# Patient Record
Sex: MALE | Race: WHITE | NOT HISPANIC OR LATINO | Employment: OTHER | URBAN - METROPOLITAN AREA
[De-identification: names, ages, dates, MRNs, and addresses within clinical notes are randomized per-mention and may not be internally consistent; named-entity substitution may affect disease eponyms.]

---

## 2017-06-22 ENCOUNTER — RECORDS - HEALTHEAST (OUTPATIENT)
Dept: LAB | Facility: CLINIC | Age: 67
End: 2017-06-22

## 2017-06-23 LAB
CHOLEST SERPL-MCNC: 189 MG/DL
FASTING STATUS PATIENT QL REPORTED: ABNORMAL
HDLC SERPL-MCNC: 40 MG/DL
LDLC SERPL CALC-MCNC: 114 MG/DL
TRIGL SERPL-MCNC: 176 MG/DL

## 2018-08-29 ENCOUNTER — RECORDS - HEALTHEAST (OUTPATIENT)
Dept: LAB | Facility: CLINIC | Age: 68
End: 2018-08-29

## 2018-08-29 LAB
ANION GAP SERPL CALCULATED.3IONS-SCNC: 11 MMOL/L (ref 5–18)
BUN SERPL-MCNC: 17 MG/DL (ref 8–22)
CALCIUM SERPL-MCNC: 9.8 MG/DL (ref 8.5–10.5)
CHLORIDE BLD-SCNC: 106 MMOL/L (ref 98–107)
CHOLEST SERPL-MCNC: 201 MG/DL
CO2 SERPL-SCNC: 25 MMOL/L (ref 22–31)
CREAT SERPL-MCNC: 0.79 MG/DL (ref 0.7–1.3)
FASTING STATUS PATIENT QL REPORTED: ABNORMAL
GFR SERPL CREATININE-BSD FRML MDRD: >60 ML/MIN/1.73M2
GLUCOSE BLD-MCNC: 100 MG/DL (ref 70–125)
HDLC SERPL-MCNC: 54 MG/DL
LDLC SERPL CALC-MCNC: 126 MG/DL
POTASSIUM BLD-SCNC: 4.3 MMOL/L (ref 3.5–5)
PSA SERPL-MCNC: 5.7 NG/ML (ref 0–4.5)
SODIUM SERPL-SCNC: 142 MMOL/L (ref 136–145)
TRIGL SERPL-MCNC: 105 MG/DL

## 2018-09-18 ENCOUNTER — RECORDS - HEALTHEAST (OUTPATIENT)
Dept: LAB | Facility: CLINIC | Age: 68
End: 2018-09-18

## 2018-09-18 LAB — PSA SERPL-MCNC: 6.5 NG/ML (ref 0–4.5)

## 2023-02-23 ENCOUNTER — TRANSCRIBE ORDERS (OUTPATIENT)
Dept: OTHER | Age: 73
End: 2023-02-23

## 2023-02-23 DIAGNOSIS — M79.672 PAIN IN LEFT FOOT: Primary | ICD-10-CM

## 2023-03-06 NOTE — TELEPHONE ENCOUNTER
DIAGNOSIS: Left foot hallux ridigis   APPOINTMENT DATE: 03/08/2023   NOTES STATUS DETAILS   OFFICE NOTE from referring provider In process 02/23/2023 [facility]   OFFICE NOTE from other specialist N/A    DISCHARGE SUMMARY from hospital N/A    DISCHARGE REPORT from the ER N/A    OPERATIVE REPORT N/A    EMG report N/A    MEDICATION LIST N/A    MRI N/A    DEXA (osteoporosis/bone health) N/A    CT SCAN N/A    XRAYS (IMAGES & REPORTS) N/A      Sent request for all records and images  Reyna Vogel on 3/6/2023 at 3:51 PM    2nd STAT request sent, xray in PACS  Reyna Vogel on 3/8/2023 at 7:28 AM

## 2023-03-08 ENCOUNTER — ANCILLARY PROCEDURE (OUTPATIENT)
Dept: GENERAL RADIOLOGY | Facility: CLINIC | Age: 73
End: 2023-03-08
Attending: PODIATRIST
Payer: COMMERCIAL

## 2023-03-08 ENCOUNTER — PRE VISIT (OUTPATIENT)
Dept: PODIATRY | Facility: CLINIC | Age: 73
End: 2023-03-08
Payer: COMMERCIAL

## 2023-03-08 ENCOUNTER — OFFICE VISIT (OUTPATIENT)
Dept: PODIATRY | Facility: CLINIC | Age: 73
End: 2023-03-08
Payer: COMMERCIAL

## 2023-03-08 VITALS — HEART RATE: 77 BPM | DIASTOLIC BLOOD PRESSURE: 80 MMHG | WEIGHT: 212 LBS | SYSTOLIC BLOOD PRESSURE: 150 MMHG

## 2023-03-08 DIAGNOSIS — M20.22 HALLUX RIGIDUS, LEFT FOOT: Primary | ICD-10-CM

## 2023-03-08 DIAGNOSIS — M20.22 HALLUX RIGIDUS, LEFT FOOT: ICD-10-CM

## 2023-03-08 PROBLEM — E78.5 HYPERLIPIDEMIA: Status: ACTIVE | Noted: 2023-03-08

## 2023-03-08 PROBLEM — J01.80 OTHER ACUTE SINUSITIS: Status: ACTIVE | Noted: 2023-03-08

## 2023-03-08 PROBLEM — Q66.50 CONGENITAL PES PLANUS, UNSPECIFIED FOOT: Status: ACTIVE | Noted: 2023-03-08

## 2023-03-08 PROBLEM — R97.20 HIGH PROSTATE SPECIFIC ANTIGEN (PSA): Status: ACTIVE | Noted: 2023-03-08

## 2023-03-08 PROBLEM — I10 HYPERTENSION: Status: ACTIVE | Noted: 2023-03-08

## 2023-03-08 PROCEDURE — 73630 X-RAY EXAM OF FOOT: CPT | Mod: LT | Performed by: RADIOLOGY

## 2023-03-08 PROCEDURE — 99204 OFFICE O/P NEW MOD 45 MIN: CPT | Performed by: PODIATRIST

## 2023-03-08 RX ORDER — FLUCONAZOLE 200 MG/1
200 TABLET ORAL
COMMUNITY
Start: 2023-02-08 | End: 2023-04-12

## 2023-03-08 RX ORDER — ATORVASTATIN CALCIUM 10 MG/1
10 TABLET, FILM COATED ORAL
COMMUNITY
Start: 2023-02-08

## 2023-03-08 NOTE — PROGRESS NOTES
[facility] of painful left foot.  This has been symptomatic for years.  Points to dorsum of first metatarsal phalangeal joint.  Has pain w/ ambulation and shoewear and is relieved by rest.  Denies pain in the contralateral foot.  Has tried carbon plates and anti-inflammatories.  Still has pain.  He is very active.  Patient likes to walk and jog for exercise.  He is wondering about more of a permanent fix for this.  He believes he may have injured this in the past.  Denies family history of arthritis here.  He is retired.  He has never smoked.    ROS:  A ten point review of systems was performed and negative except where indicated above.          No Known Allergies    Current Outpatient Medications   Medication Sig Dispense Refill     atorvastatin (LIPITOR) 10 MG tablet 10 mg       fluconazole (DIFLUCAN) 200 MG tablet 200 mg         Patient Active Problem List   Diagnosis     Other acute sinusitis     Hypertension     Hyperlipidemia     High prostate specific antigen (PSA)     Hallux rigidus, left foot     Congenital pes planus, unspecified foot       No past medical history on file.    No past surgical history on file.    No family history on file.    Social History     Tobacco Use     Smoking status: Never     Smokeless tobacco: Never   Substance Use Topics     Alcohol use: Not on file         EXAM:    Vitals: BP (!) 150/80   Pulse 77   Wt 96.2 kg (212 lb)   BMI: There is no height or weight on file to calculate BMI.  Height: Data Unavailable    Constitutional/ general:  Pt is in no apparent distress, appears well-nourished.  Cooperative with history and physical exam.     Psych:  The patient answered questions appropriately.  Normal affect.  Seems to have reasonable expectations, in terms of treatment.     Eyes:  Visual scanning/ tracking without deficit.     Ears:  Response to auditory stimuli is normal.  No  hearing aid devices.  Auricles in proper  alignment.     Lymphatic:  Popliteal lymph nodes not enlarged.     Lungs:  Non labored breathing, non labored speech. No cough.  No audible wheezing. Even, quiet breathing.       Vascular:  Pedal pulses are palpable bilaterally for both the DP and PT arteries.  CFT < 3 sec. slight ankle edema and varicosities noted.  Pedal hair growth noted.     Neuro:  Alert and oriented x 3. Coordinated gait.  Light touch sensation is intact to the L4, L5, S1 distributions. No obvious deficits.  No evidence of neurological-based weakness, spasticity, or contracture in the lower extremities.     Derm: Normal texture and turgor.  No erythema, ecchymosis, or cyanosis.  No open lesions.     Musculoskeletal:No forefoot or rear foot deformities noted.  MS 5/5 all compartments.    No equinus.   Pronated arch.  Normal ROM all forefoot and rearfoot joints except ernx5ta MTPJ.  Patient has pain with range of motion.  Scott proliferation dorsally.  No echymosis, edema, signs of infection or trauma.          Radiographic Exam:   X-ray three views foot shows joint space narrowing of 1st mtpj, subcondral schlerosis, and a dorsal flag with joint mice.  No other fractures/pathology noted.     Assessment:  Hallux Limitus left foot    Plan:  X-rays taken today left foot..  Discussed etiology and treatment options in detail w/ the pt.  Xrays reviewed with patient.  Recommended wearing a stiff soled shoe, icing, limiting activities, not going barefoot, and taking ibuprofen prn for discomfort.  Patient will stop ibuprofen if any gi distress occurs.  Also discussed surgical options if conservative treatment fails.  Patient would like to discuss surgery.  Discussed various options.  Because of severity of arthritis patient would best be served with fusion left first MTPJ.  Risk complications and efficacy discussed.  These include infection numbness nonunion and continued pain.  Same day surgery MAC anesthesia.  Discussed recovery.  Nonweightbearing 4 to  6 weeks afterwards.  Patient is thinking he would like to get this done soon.  He will discuss with family and will call when he was ready to set this up.  Thank you for allowing me to participate in the care of this patient.  Please send a [facility].    Taco Brown, SUMAN, FACFAS

## 2023-03-08 NOTE — LETTER
3/8/2023         RE: Avery Hernadez  [address]  [facility] [address]        Dear Colleague,    Thank you for referring your patient, Avery Hernadez, to the [facility]. Please see a copy of my visit note below.    Pt is [facility] with [facility]/c of painful left foot.  This has been symptomatic for years.  Points to dorsum of first metatarsal phalangeal joint.  Has pain w/ ambulation and shoewear and is relieved by rest.  Denies pain in the contralateral foot.  Has tried carbon plates and anti-inflammatories.  Still has pain.  He is very active.  Patient likes to walk and jog for exercise.  He is wondering about more of a permanent fix for this.  He believes he may have injured this in the past.  Denies family history of arthritis here.  He is retired.  He has never smoked.    ROS:  A ten point review of systems was performed and negative except where indicated above.          No Known Allergies    Current Outpatient Medications   Medication Sig Dispense Refill     atorvastatin (LIPITOR) 10 MG tablet 10 mg       fluconazole (DIFLUCAN) 200 MG tablet 200 mg         Patient Active Problem List   Diagnosis     Other acute sinusitis     Hypertension     Hyperlipidemia     High prostate specific antigen (PSA)     Hallux rigidus, left foot     Congenital pes planus, unspecified foot       No past medical history on file.    No past surgical history on file.    No family history on file.    Social History     Tobacco Use     Smoking status: Never     Smokeless tobacco: Never   Substance Use Topics     Alcohol use: Not on file         EXAM:    Vitals: BP (!) 150/80   Pulse 77   Wt 96.2 kg (212 lb)   BMI: There is no height or weight on file to calculate BMI.  Height: Data Unavailable    Constitutional/ general:  Pt is in no apparent distress, appears well-nourished.  Cooperative with history and physical exam.     Psych:  The patient answered questions  appropriately.  Normal affect.  Seems to have reasonable expectations, in terms of treatment.     Eyes:  Visual scanning/ tracking without deficit.     Ears:  Response to auditory stimuli is normal.  No  hearing aid devices.  Auricles in proper alignment.     Lymphatic:  Popliteal lymph nodes not enlarged.     Lungs:  Non labored breathing, non labored speech. No cough.  No audible wheezing. Even, quiet breathing.       Vascular:  Pedal pulses are palpable bilaterally for both the DP and PT arteries.  CFT < 3 sec. slight ankle edema and varicosities noted.  Pedal hair growth noted.     Neuro:  Alert and oriented x 3. Coordinated gait.  Light touch sensation is intact to the L4, L5, S1 distributions. No obvious deficits.  No evidence of neurological-based weakness, spasticity, or contracture in the lower extremities.     Derm: Normal texture and turgor.  No erythema, ecchymosis, or cyanosis.  No open lesions.     Musculoskeletal:No forefoot or rear foot deformities noted.  MS 5/5 all compartments.    No equinus.   Pronated arch.  Normal ROM all forefoot and rearfoot joints except ipak8qi MTPJ.  Patient has pain with range of motion.  Scott proliferation dorsally.  No echymosis, edema, signs of infection or trauma.          Radiographic Exam:   X-ray three views foot shows joint space narrowing of 1st mtpj, subcondral schlerosis, and a dorsal flag with joint mice.  No other fractures/pathology noted.     Assessment:  Hallux Limitus left foot    Plan:  X-rays taken today left foot..  Discussed etiology and treatment options in detail w/ the pt.  Xrays reviewed with patient.  Recommended wearing a stiff soled shoe, icing, limiting activities, not going barefoot, and taking ibuprofen prn for discomfort.  Patient will stop ibuprofen if any gi distress occurs.  Also discussed surgical options if conservative treatment fails.  Patient would like to discuss surgery.  Discussed various options.  Because of severity of  arthritis patient would best be served with fusion left first MTPJ.  Risk complications and efficacy discussed.  These include infection numbness nonunion and continued pain.  Same day surgery MAC anesthesia.  Discussed recovery.  Nonweightbearing 4 to 6 weeks afterwards.  Patient is thinking he would like to get this done soon.  He will discuss with family and will call when he was ready to set this up.  Thank you for allowing me to participate in the care of this patient.  Please send a [facility]    Taco Brown DPM, FACFAS            Again, thank you for allowing me to participate in the care of your patient.        Sincerely,        Taco Brown DPM

## 2023-03-08 NOTE — PATIENT INSTRUCTIONS
We wish you continued good healing. If you have any questions or concerns, please do not hesitate to contact us at  [phone]    [employer] (secure e-mail communication and access to your chart) to send a message or to make an appointment.    Please remember to call and schedule a follow up appointment if one was recommended at your earliest convenience.     PODIATRY CLINIC HOURS  TELEPHONE NUMBER    Dr. Taco WHEELERPMINA [facility]        Clinics:  [facility]   [place]  Tuesday 1PM-6PM  [place]  Wednesday 745AM-330PM  [place]  Thursday/Friday 745AM-230PM       [place] APPOINTMENTS  [phone]    [place] APPOINTMENTS  [phone]        If you need a medication refill, please contact us you may need lab work and/or a follow up visit prior to your refill (i.e. Antifungal medications).  If MRI needed please call Imaging at [phone]   HOW DO I GET MY KNEE SCOOTER? Knee scooters can be picked up at ANY Medical Supply stores with your knee scooter Prescription.  OR  Bring your signed prescription to an [facility] Medical Equipment showroom.

## 2023-03-20 ENCOUNTER — TELEPHONE (OUTPATIENT)
Dept: PODIATRY | Facility: CLINIC | Age: 73
End: 2023-03-20
Payer: COMMERCIAL

## 2023-03-20 NOTE — TELEPHONE ENCOUNTER
Reason for Call:  Other call back    Detailed comments: Patient is calling to schedule surgery orders are needed     Phone Number Patient can be reached at: Cell number on file:    Telephone Information:   Mobile [phone]       Best Time: any    Can we leave a detailed message on this number? YES    Call taken on 3/20/2023 at 11:54 AM by Rosalina Munguia

## 2023-03-21 ENCOUNTER — TELEPHONE (OUTPATIENT)
Dept: PODIATRY | Facility: CLINIC | Age: 73
End: 2023-03-21
Payer: COMMERCIAL

## 2023-03-21 NOTE — TELEPHONE ENCOUNTER
Type of surgery: left first metatarsalphalangeal joint fusion (Left)   CPT 10960   Hallux rigidus, left foot M20.22    Location of surgery: [facility]  Date and time of surgery: 04/04/2023  Surgeon: IKER   Pre-Op Appt Date: 03/28/2023  Post-Op Appt Date: 04/12/2023   Packet sent out: Yes  Pre-cert/Authorization completed: [facility] for foot pain. [id number]  Date range: 3/8/23 - 9/4/23    Date: 3/21/23    Jazmine Harrison  Financial Securing/Prior Auth Dept  [phone]  ( patient notes that should be covered by the [facility] )

## 2023-03-29 ENCOUNTER — OFFICE VISIT (OUTPATIENT)
Dept: FAMILY MEDICINE | Facility: CLINIC | Age: 73
End: 2023-03-29
Payer: COMMERCIAL

## 2023-03-29 VITALS
RESPIRATION RATE: 16 BRPM | WEIGHT: 205.9 LBS | SYSTOLIC BLOOD PRESSURE: 148 MMHG | OXYGEN SATURATION: 96 % | TEMPERATURE: 98.1 F | HEART RATE: 77 BPM | DIASTOLIC BLOOD PRESSURE: 90 MMHG | HEIGHT: 71 IN | BODY MASS INDEX: 28.82 KG/M2

## 2023-03-29 DIAGNOSIS — H61.23 BILATERAL IMPACTED CERUMEN: ICD-10-CM

## 2023-03-29 DIAGNOSIS — Z01.818 PREOP GENERAL PHYSICAL EXAM: Primary | ICD-10-CM

## 2023-03-29 DIAGNOSIS — M20.12 HALLUX VALGUS, ACQUIRED, LEFT: ICD-10-CM

## 2023-03-29 DIAGNOSIS — E78.5 HYPERLIPIDEMIA, UNSPECIFIED HYPERLIPIDEMIA TYPE: ICD-10-CM

## 2023-03-29 DIAGNOSIS — I15.9 SECONDARY HYPERTENSION: ICD-10-CM

## 2023-03-29 PROCEDURE — 99204 OFFICE O/P NEW MOD 45 MIN: CPT | Mod: 25 | Performed by: NURSE PRACTITIONER

## 2023-03-29 PROCEDURE — 69209 REMOVE IMPACTED EAR WAX UNI: CPT | Mod: RT | Performed by: NURSE PRACTITIONER

## 2023-03-29 ASSESSMENT — PAIN SCALES - GENERAL: PAINLEVEL: NO PAIN (0)

## 2023-03-29 NOTE — NURSING NOTE
Patient identified using two patient identifiers.  Ear exam showing wax occlusion completed by DIANA MONTEIRO  Solution: warm water was placed in the both ear(s) via irrigation tool: elephant ear.

## 2023-03-29 NOTE — PROGRESS NOTES
[address] [address]  Phone: [phone]  Primary Provider: London Nelson  Pre-op Performing Provider: PEG SANCHEZ      PREOPERATIVE EVALUATION:  Today's date: 3/29/2023    Avery Hernadez is a 72 year old male who presents for a preoperative evaluation.  No flowsheet data found.  Surgical Information:  Surgery/Procedure: Hallux rigidus, left foot  Surgery Location: [facility]   Surgeon: Taco Brown DPM  Surgery Date: 04/04/23  Time of Surgery: TBD  Where patient plans to recover: At home with family  Fax number for surgical facility: Note does not need to be faxed, will be available electronically in Epic.    Assessment & Plan     The proposed surgical procedure is considered LOW risk.    Preop general physical exam  Normal exam no concerns.  No EKG or labs done today    Hallux valgus, acquired, left  Reason for surgery it is causing pain    Bilateral impacted cerumen  Both ear canals were impacted with cerumen, both were clear after irrigation  - HC REMOVAL IMPACTED CERUMEN IRRIGATION/LVG UNILAT    Secondary hypertension  Stable    Hyperlipidemia, unspecified hyperlipidemia type  Stable             Risks and Recommendations:  The patient has the following additional risks and recommendations for perioperative complications:   - No identified additional risk factors other than previously addressed    Medication Instructions:  Patient is to take all scheduled medications on the day of surgery    RECOMMENDATION:  APPROVAL GIVEN to proceed with proposed procedure, without further diagnostic evaluation.      Subjective     HPI related to upcoming procedure:     Left toe pain      [facility] [place]      Preop Questions 3/29/2023   1. Have you ever had a heart attack or stroke? No   2. Have you ever had surgery on your heart or blood vessels, such as a stent placement, a coronary artery bypass, or surgery  on an artery in your head, neck, heart, or legs? No   3. Do you have chest pain with activity? No   4. Do you have a history of  heart failure? No   5. Do you currently have a cold, bronchitis or symptoms of other infection? No   6. Do you have a cough, shortness of breath, or wheezing? No   7. Do you or anyone in your family have previous history of blood clots? No   8. Do you or does anyone in your family have a serious bleeding problem such as prolonged bleeding following surgeries or cuts? No   9. Have you ever had problems with anemia or been told to take iron pills? No   10. Have you had any abnormal blood loss such as black, tarry or bloody stools? No   11. Have you ever had a blood transfusion? UNKNOWN -    12. Are you willing to have a blood transfusion if it is medically needed before, during, or after your surgery? Yes   13. Have you or any of your relatives ever had problems with anesthesia? No   14. Do you have sleep apnea, excessive snoring or daytime drowsiness? No   15. Do you have any artifical heart valves or other implanted medical devices like a pacemaker, defibrillator, or continuous glucose monitor? No   16. Do you have artificial joints? No   17. Are you allergic to latex? No     Health Care Directive:  Patient does not have a Health Care Directive or Living Will: Discussed advance care planning with patient; however, patient declined at this time.    Preoperative Review of :   reviewed - no record of controlled substances prescribed.      Used to take blood pressure medication, went off as after 35 lb weight loss stopped it as BP was getting too low. At home BP looks great.     No smoking, quit 40 years ago  Alcohol: beer or hard drink once in a while      Review of Systems  CONSTITUTIONAL: NEGATIVE for fever, chills, change in weight  INTEGUMENTARY/SKIN: NEGATIVE for worrisome rashes, moles or lesions  EYES: NEGATIVE for vision changes or irritation  ENT/MOUTH: NEGATIVE for ear, mouth  "and throat problems  RESP: NEGATIVE for significant cough or SOB  CV: NEGATIVE for chest pain, palpitations or peripheral edema  GI: NEGATIVE for nausea, abdominal pain, heartburn, or change in bowel habits  : NEGATIVE for frequency, dysuria, or hematuria  MUSCULOSKELETAL: NEGATIVE for significant arthralgias or myalgia  NEURO: NEGATIVE for weakness, dizziness or paresthesias  ENDOCRINE: NEGATIVE for temperature intolerance, skin/hair changes  HEME: NEGATIVE for bleeding problems  PSYCHIATRIC: NEGATIVE for changes in mood or affect    Patient Active Problem List    Diagnosis Date Noted     Other acute sinusitis 03/08/2023     Priority: Medium     Hypertension 03/08/2023     Priority: Medium     Hyperlipidemia 03/08/2023     Priority: Medium     High prostate specific antigen (PSA) 03/08/2023     Priority: Medium     Nov 03, 2020 Entered By: RIANA MENSAH Comment: 6.5 in 09/2018       Hallux rigidus, left foot 03/08/2023     Priority: Medium     Congenital pes planus, unspecified foot 03/08/2023     Priority: Medium      No past medical history on file.  Past Surgical History:   Procedure Laterality Date     NOSE SURGERY       Current Outpatient Medications   Medication Sig Dispense Refill     atorvastatin (LIPITOR) 10 MG tablet 10 mg       fluconazole (DIFLUCAN) 200 MG tablet 200 mg         No Known Allergies     Social History     Tobacco Use     Smoking status: Never     Smokeless tobacco: Never   Substance Use Topics     Alcohol use: Yes     Comment: beer once in awhile     Family History   Problem Relation Age of Onset     Myocardial Infarction Mother 62     Diabetes Brother      Diabetes Brother      Colon Cancer No family hx of      Breast Cancer No family hx of      History   Drug Use Unknown         Objective     BP (!) 148/90   Pulse 77   Temp 98.1  F (36.7  C) (Oral)   Resp 16   Ht 1.815 m (5' 11.46\")   Wt 93.4 kg (205 lb 14.4 oz)   SpO2 96%   BMI 28.35 kg/m      Physical Exam    GENERAL " APPEARANCE: healthy, alert and no distress     EYES: EOMI,  PERRL     HENT: ear canals and TM's normal after irrigation. Before both were impacted with soft cerumen. and nose and mouth without ulcers or lesions     NECK: no adenopathy, no asymmetry, masses, or scars and thyroid normal to palpation     RESP: lungs clear to auscultation - no rales, rhonchi or wheezes     CV: regular rates and rhythm, normal S1 S2, no S3 or S4 and no murmur, click or rub     ABDOMEN:  soft, nontender, no HSM or masses and bowel sounds normal     MS: extremities normal- no gross deformities noted, no evidence of inflammation in joints, FROM in all extremities.     SKIN: no suspicious lesions or rashes     NEURO: Normal strength and tone, sensory exam grossly normal, mentation intact and speech normal     PSYCH: mentation appears normal. and affect normal/bright     LYMPHATICS: No cervical adenopathy    No results for input(s): HGB, PLT, INR, NA, POTASSIUM, CR, A1C in the last 98833 hours.     Diagnostics:  No labs were ordered during this visit.   No EKG required, no history of coronary heart disease, significant arrhythmia, peripheral arterial disease or other structural heart disease.    Revised Cardiac Risk Index (RCRI):  The patient has the following serious cardiovascular risks for perioperative complications:   - No serious cardiac risks = 0 points     RCRI Interpretation: 0 points: Class I (very low risk - 0.4% complication rate)           Signed Electronically by:   GLORIA Looney NP-C  [facility]  Copy of this evaluation report is provided to requesting physician.

## 2023-04-03 ENCOUNTER — ANESTHESIA EVENT (OUTPATIENT)
Dept: SURGERY | Facility: AMBULATORY SURGERY CENTER | Age: 73
End: 2023-04-03
Payer: COMMERCIAL

## 2023-04-04 ENCOUNTER — ANESTHESIA (OUTPATIENT)
Dept: SURGERY | Facility: AMBULATORY SURGERY CENTER | Age: 73
End: 2023-04-04
Payer: COMMERCIAL

## 2023-04-04 ENCOUNTER — ANCILLARY PROCEDURE (OUTPATIENT)
Dept: GENERAL RADIOLOGY | Facility: CLINIC | Age: 73
End: 2023-04-04
Attending: PODIATRIST
Payer: COMMERCIAL

## 2023-04-04 ENCOUNTER — HOSPITAL ENCOUNTER (OUTPATIENT)
Facility: AMBULATORY SURGERY CENTER | Age: 73
Discharge: HOME | End: 2023-04-04
Attending: PODIATRIST | Admitting: PODIATRIST
Payer: COMMERCIAL

## 2023-04-04 VITALS
DIASTOLIC BLOOD PRESSURE: 86 MMHG | RESPIRATION RATE: 16 BRPM | TEMPERATURE: 97.6 F | OXYGEN SATURATION: 100 % | HEART RATE: 69 BPM | SYSTOLIC BLOOD PRESSURE: 126 MMHG

## 2023-04-04 DIAGNOSIS — R52 PAIN: ICD-10-CM

## 2023-04-04 DIAGNOSIS — M20.22 HALLUX RIGIDUS, LEFT FOOT: Primary | ICD-10-CM

## 2023-04-04 PROCEDURE — 28750 FUSION OF BIG TOE JOINT: CPT | Mod: TA

## 2023-04-04 PROCEDURE — G8907 PT DOC NO EVENTS ON DISCHARG: HCPCS

## 2023-04-04 PROCEDURE — 28750 FUSION OF BIG TOE JOINT: CPT | Mod: LT | Performed by: PODIATRIST

## 2023-04-04 PROCEDURE — G8916 PT W IV AB GIVEN ON TIME: HCPCS

## 2023-04-04 PROCEDURE — C1713 ANCHOR/SCREW BN/BN,TIS/BN: HCPCS

## 2023-04-04 RX ORDER — HALOPERIDOL 5 MG/ML
1 INJECTION INTRAMUSCULAR
Status: DISCONTINUED | OUTPATIENT
Start: 2023-04-04 | End: 2023-04-05 | Stop reason: HOSPADM

## 2023-04-04 RX ORDER — ONDANSETRON 2 MG/ML
INJECTION INTRAMUSCULAR; INTRAVENOUS PRN
Status: DISCONTINUED | OUTPATIENT
Start: 2023-04-04 | End: 2023-04-04

## 2023-04-04 RX ORDER — SODIUM CHLORIDE, SODIUM LACTATE, POTASSIUM CHLORIDE, CALCIUM CHLORIDE 600; 310; 30; 20 MG/100ML; MG/100ML; MG/100ML; MG/100ML
INJECTION, SOLUTION INTRAVENOUS CONTINUOUS
Status: DISCONTINUED | OUTPATIENT
Start: 2023-04-04 | End: 2023-04-05 | Stop reason: HOSPADM

## 2023-04-04 RX ORDER — LIDOCAINE HYDROCHLORIDE 20 MG/ML
INJECTION, SOLUTION INFILTRATION; PERINEURAL PRN
Status: DISCONTINUED | OUTPATIENT
Start: 2023-04-04 | End: 2023-04-04

## 2023-04-04 RX ORDER — ONDANSETRON 2 MG/ML
4 INJECTION INTRAMUSCULAR; INTRAVENOUS EVERY 30 MIN PRN
Status: DISCONTINUED | OUTPATIENT
Start: 2023-04-04 | End: 2023-04-05 | Stop reason: HOSPADM

## 2023-04-04 RX ORDER — LIDOCAINE 40 MG/G
CREAM TOPICAL
Status: DISCONTINUED | OUTPATIENT
Start: 2023-04-04 | End: 2023-04-05 | Stop reason: HOSPADM

## 2023-04-04 RX ORDER — ACETAMINOPHEN 325 MG/1
975 TABLET ORAL ONCE
Status: COMPLETED | OUTPATIENT
Start: 2023-04-04 | End: 2023-04-04

## 2023-04-04 RX ORDER — DEXAMETHASONE SODIUM PHOSPHATE 4 MG/ML
4 INJECTION, SOLUTION INTRA-ARTICULAR; INTRALESIONAL; INTRAMUSCULAR; INTRAVENOUS; SOFT TISSUE
Status: DISCONTINUED | OUTPATIENT
Start: 2023-04-04 | End: 2023-04-05 | Stop reason: HOSPADM

## 2023-04-04 RX ORDER — FENTANYL CITRATE 50 UG/ML
25 INJECTION, SOLUTION INTRAMUSCULAR; INTRAVENOUS
Status: DISCONTINUED | OUTPATIENT
Start: 2023-04-04 | End: 2023-04-05 | Stop reason: HOSPADM

## 2023-04-04 RX ORDER — LABETALOL HYDROCHLORIDE 5 MG/ML
10 INJECTION, SOLUTION INTRAVENOUS
Status: DISCONTINUED | OUTPATIENT
Start: 2023-04-04 | End: 2023-04-05 | Stop reason: HOSPADM

## 2023-04-04 RX ORDER — HYDROCODONE BITARTRATE AND ACETAMINOPHEN 5; 325 MG/1; MG/1
1-2 TABLET ORAL EVERY 4 HOURS PRN
Qty: 25 TABLET | Refills: 0 | Status: SHIPPED | OUTPATIENT
Start: 2023-04-04 | End: 2023-05-17

## 2023-04-04 RX ORDER — ALBUTEROL SULFATE 0.83 MG/ML
2.5 SOLUTION RESPIRATORY (INHALATION) EVERY 4 HOURS PRN
Status: DISCONTINUED | OUTPATIENT
Start: 2023-04-04 | End: 2023-04-05 | Stop reason: HOSPADM

## 2023-04-04 RX ORDER — BUPIVACAINE HYDROCHLORIDE 5 MG/ML
INJECTION, SOLUTION PERINEURAL PRN
Status: DISCONTINUED | OUTPATIENT
Start: 2023-04-04 | End: 2023-04-04 | Stop reason: HOSPADM

## 2023-04-04 RX ORDER — KETOROLAC TROMETHAMINE 30 MG/ML
15 INJECTION, SOLUTION INTRAMUSCULAR; INTRAVENOUS
Status: DISCONTINUED | OUTPATIENT
Start: 2023-04-04 | End: 2023-04-05 | Stop reason: HOSPADM

## 2023-04-04 RX ORDER — CEFAZOLIN SODIUM 2 G/100ML
2 INJECTION, SOLUTION INTRAVENOUS SEE ADMIN INSTRUCTIONS
Status: DISCONTINUED | OUTPATIENT
Start: 2023-04-04 | End: 2023-04-05 | Stop reason: HOSPADM

## 2023-04-04 RX ORDER — OXYCODONE HYDROCHLORIDE 5 MG/1
10 TABLET ORAL
Status: DISCONTINUED | OUTPATIENT
Start: 2023-04-04 | End: 2023-04-05 | Stop reason: HOSPADM

## 2023-04-04 RX ORDER — FENTANYL CITRATE 50 UG/ML
50 INJECTION, SOLUTION INTRAMUSCULAR; INTRAVENOUS EVERY 5 MIN PRN
Status: DISCONTINUED | OUTPATIENT
Start: 2023-04-04 | End: 2023-04-05 | Stop reason: HOSPADM

## 2023-04-04 RX ORDER — PROPOFOL 10 MG/ML
INJECTION, EMULSION INTRAVENOUS CONTINUOUS PRN
Status: DISCONTINUED | OUTPATIENT
Start: 2023-04-04 | End: 2023-04-04

## 2023-04-04 RX ORDER — CEFAZOLIN SODIUM 2 G/100ML
2 INJECTION, SOLUTION INTRAVENOUS
Status: COMPLETED | OUTPATIENT
Start: 2023-04-04 | End: 2023-04-04

## 2023-04-04 RX ORDER — FENTANYL CITRATE 50 UG/ML
INJECTION, SOLUTION INTRAMUSCULAR; INTRAVENOUS PRN
Status: DISCONTINUED | OUTPATIENT
Start: 2023-04-04 | End: 2023-04-04

## 2023-04-04 RX ORDER — OXYCODONE HYDROCHLORIDE 5 MG/1
5 TABLET ORAL
Status: DISCONTINUED | OUTPATIENT
Start: 2023-04-04 | End: 2023-04-05 | Stop reason: HOSPADM

## 2023-04-04 RX ORDER — PROPOFOL 10 MG/ML
INJECTION, EMULSION INTRAVENOUS PRN
Status: DISCONTINUED | OUTPATIENT
Start: 2023-04-04 | End: 2023-04-04

## 2023-04-04 RX ORDER — HYDROXYZINE HYDROCHLORIDE 10 MG/1
10 TABLET, FILM COATED ORAL EVERY 6 HOURS PRN
Status: DISCONTINUED | OUTPATIENT
Start: 2023-04-04 | End: 2023-04-05 | Stop reason: HOSPADM

## 2023-04-04 RX ORDER — FENTANYL CITRATE 50 UG/ML
25 INJECTION, SOLUTION INTRAMUSCULAR; INTRAVENOUS EVERY 5 MIN PRN
Status: DISCONTINUED | OUTPATIENT
Start: 2023-04-04 | End: 2023-04-05 | Stop reason: HOSPADM

## 2023-04-04 RX ORDER — ONDANSETRON 4 MG/1
4 TABLET, ORALLY DISINTEGRATING ORAL EVERY 30 MIN PRN
Status: DISCONTINUED | OUTPATIENT
Start: 2023-04-04 | End: 2023-04-05 | Stop reason: HOSPADM

## 2023-04-04 RX ORDER — DIAZEPAM 10 MG/2ML
2.5 INJECTION, SOLUTION INTRAMUSCULAR; INTRAVENOUS
Status: DISCONTINUED | OUTPATIENT
Start: 2023-04-04 | End: 2023-04-05 | Stop reason: HOSPADM

## 2023-04-04 RX ORDER — MEPERIDINE HYDROCHLORIDE 25 MG/ML
12.5 INJECTION INTRAMUSCULAR; INTRAVENOUS; SUBCUTANEOUS EVERY 5 MIN PRN
Status: DISCONTINUED | OUTPATIENT
Start: 2023-04-04 | End: 2023-04-05 | Stop reason: HOSPADM

## 2023-04-04 RX ADMIN — SODIUM CHLORIDE, SODIUM LACTATE, POTASSIUM CHLORIDE, CALCIUM CHLORIDE: 600; 310; 30; 20 INJECTION, SOLUTION INTRAVENOUS at 06:19

## 2023-04-04 RX ADMIN — ONDANSETRON 4 MG: 2 INJECTION INTRAMUSCULAR; INTRAVENOUS at 09:27

## 2023-04-04 RX ADMIN — PROPOFOL 100 MG: 10 INJECTION, EMULSION INTRAVENOUS at 07:11

## 2023-04-04 RX ADMIN — PROPOFOL 60 MCG/KG/MIN: 10 INJECTION, EMULSION INTRAVENOUS at 07:18

## 2023-04-04 RX ADMIN — LIDOCAINE HYDROCHLORIDE 40 MG: 20 INJECTION, SOLUTION INFILTRATION; PERINEURAL at 07:11

## 2023-04-04 RX ADMIN — ACETAMINOPHEN 975 MG: 325 TABLET ORAL at 06:11

## 2023-04-04 RX ADMIN — CEFAZOLIN SODIUM 2 G: 2 INJECTION, SOLUTION INTRAVENOUS at 07:05

## 2023-04-04 RX ADMIN — FENTANYL CITRATE 50 MCG: 50 INJECTION, SOLUTION INTRAMUSCULAR; INTRAVENOUS at 07:05

## 2023-04-04 ASSESSMENT — LIFESTYLE VARIABLES: TOBACCO_USE: 1

## 2023-04-04 NOTE — OP NOTE
Procedure Date: 04/04/2023    PREOPERATIVE DIAGNOSIS:  Left hallux limitus.    POSTOPERATIVE DIAGNOSIS:  Left hallux limitus.    PROCEDURE:  Left first metatarsophalangeal joint fusion.    SURGEON:  Taco Brown DPM.    ANESTHESIA:  MAC.    HEMOSTASIS:  Pneumatic ankle tourniquet at 250 mmHg.    INDICATIONS FOR PROCEDURE:  This patient has a painful arthritic joint, which he elects to have fused.  He understands possible complications include infection, pain, numbness and poor bone healing.    DESCRIPTION OF PROCEDURE:  The patient was brought to the operating table and laid in a supine position.  He was then given sedation by the anesthesiologist and a left first Figueroa block was done.  The foot was then prepped and draped in the normal sterile manner.  Pneumatic ankle tourniquet was placed around the ankle with copious amounts of Webril padding.  The foot was then elevated for complete exsanguination.  The tourniquet inflated and the foot was brought down on the operating table and the following procedure was performed.      At this time, an incision was made just medial to the extensor hallucis longus tendon over the left first MTPJ.  This brought down the deep fascia utilizing blunt and sharp dissection techniques.  All neurovascular structures that were encountered were either, tied, or retracted to the side.  Periosteum was incised, the entire length of the incision and reflected dorsal, medial and dorsolateral.  Joint mice were noted and removed.  Significant hypertrophic bone was noted all around the joint.  This was removed.  We then evaluated the joint.  Almost all cartilage was gone.  We then used the Arthrex reaming set and reamed out the remaining cartilage and subchondral bone.  On the plantar lateral base of the proximal phalanx base a bone cyst was noted.  We curetted this out.  This was drilled.  We packed this with autogenous bone graft.  We did subchondral drilling.  We reapproximated the joint.   We positioned correct in all 3 planes.  We fixated this with a guide wire from medial distal to proximal lateral.  Good reduction of the deformity was noted.  This was confirmed with the Fluoroscan.  We then placed a plate on the dorsum of the fusion site and temporally fixated.  Plate flush and in good position.  Screws were placed distal to fusion site.  We then confirmed the placement of the fusion site.  Three screws were then placed proximal.  The fusion was solid.  We loaded the foot.  Good reduction of deformity was noted.  We then placed screw over the guide wire from medial distal to proximal lateral utilizing standard AO fixation techniques.  We loaded the foot.  Good reduction of the deformity was noted.  The position was confirmed with Fluoroscan.  All sharp bony prominences were burred smooth.  The fusion site was tight.  We flushed the wound.  Standard layered closure was done at this time.  We dressed this with Adaptic, 4 x 4s, Marilee, Kerlix and Coban.  Tourniquet was deflated.  All digits were noted to show preoperative levels of perfusion.    COMPLICATIONS:  None.    ESTIMATED BLOOD LOSS:  1 mL.    DISPOSITION:  The patient tolerated the procedure and anesthesia well.  He was then wheeled from the operating room to the recovery room with vital signs stable and an intact sterile dressing.    Taco Brown DPM        D: 2023   T: 2023   MT: TITUS    Name:     FANY ANDERSON  MRN:      [id number]        Account:        [id number]   :      1950           Procedure Date: 2023     Document: [id number]

## 2023-04-04 NOTE — DISCHARGE INSTRUCTIONS
[facility]   Adult Discharge Orders & Instructions     For 24 hours after surgery    Get plenty of rest.  A responsible adult must stay with you for at least 24 hours after you leave the hospital.   Do not drive or use heavy equipment.  If you have weakness or tingling, don't drive or use heavy equipment until this feeling goes away.  Do not drink alcohol.  Avoid strenuous or risky activities.  Ask for help when climbing stairs.   You may feel lightheaded.  IF so, sit for a few minutes before standing.  Have someone help you get up.   If you have nausea (feel sick to your stomach): Drink only clear liquids such as apple juice, ginger ale, broth or 7-Up.  Rest may also help.  Be sure to drink enough fluids.  Move to a regular diet as you feel able.  You may have a slight fever. Call the doctor if your fever is over 100 F (37.7 C) (taken under the tongue) or lasts longer than 24 hours.  You may have a dry mouth, a sore throat, muscle aches or trouble sleeping.  These should go away after 24 hours.  Do not make important or legal decisions.     Call your doctor for any of the followin.  Signs of infection (fever, growing tenderness at the surgery site, a large amount of drainage or bleeding, severe pain, foul-smelling drainage, redness, swelling).    2. It has been over 8 to 10 hours since surgery and you are still not able to urinate (pass water).    3.  Headache for over 24 hours.    4. Signs of Covid-19 infection (temperature over 100 degrees, shortness of breath, cough, loss of taste/smell, generalized body aches, persistent headache,                  chills, sore throat, nausea/vomiting/diarrhea).    Tylenol 975 mg was given at 6:11 AM.  You should not take more then 4,000 mg of tylenol/acetaminophen in a 24 hour period.

## 2023-04-04 NOTE — INTERVAL H&P NOTE
"I have reviewed the surgical (or preoperative) H&P that is linked to this encounter, and examined the patient. There are no significant changes    Clinical Conditions Present on Arrival:  Clinically Significant Risk Factors Present on Admission                    # Overweight: Estimated body mass index is 28.35 kg/m  as calculated from the following:    Height as of 3/29/23: 1.815 m (5' 11.46\").    Weight as of 3/29/23: 93.4 kg (205 lb 14.4 oz).       "

## 2023-04-04 NOTE — ANESTHESIA PREPROCEDURE EVALUATION
Anesthesia Pre-Procedure Evaluation    Patient: Avery Hernadez   MRN: [id number] : 1950        Procedure : Procedure(s):  left first metatarsalphalangeal joint fusion          No past medical history on file.   Past Surgical History:   Procedure Laterality Date     NOSE SURGERY        No Known Allergies   Social History     Tobacco Use     Smoking status: Never     Smokeless tobacco: Never   Vaping Use     Vaping status: Never Used   Substance Use Topics     Alcohol use: Yes     Comment: beer once in awhile      Wt Readings from Last 1 Encounters:   23 93.4 kg (205 lb 14.4 oz)        Anesthesia Evaluation   Pt has had prior anesthetic. Type: General.    No history of anesthetic complications       ROS/MED HX  ENT/Pulmonary:     (+) tobacco use, Past use,     Neurologic:  - neg neurologic ROS     Cardiovascular:     (+) hypertension-----    METS/Exercise Tolerance:     Hematologic:  - neg hematologic  ROS     Musculoskeletal:  - neg musculoskeletal ROS     GI/Hepatic:  - neg GI/hepatic ROS     Renal/Genitourinary:  - neg Renal ROS     Endo:  - neg endo ROS     Psychiatric/Substance Use:  - neg psychiatric ROS     Infectious Disease:  - neg infectious disease ROS     Malignancy:  - neg malignancy ROS     Other:  - neg other ROS          Physical Exam    Airway  airway exam normal           Respiratory Devices and Support         Dental       (+) Completely normal teeth      Cardiovascular   cardiovascular exam normal          Pulmonary   pulmonary exam normal                OUTSIDE LABS:  CBC: No results found for: WBC, HGB, HCT, PLT  BMP:   Lab Results   Component Value Date     2018    POTASSIUM 4.3 2018    CHLORIDE 106 2018    CO2 25 2018    BUN 17 2018    CR 0.79 2018     2018     COAGS: No results found for: PTT, INR, FIBR  POC: No results found for: BGM, HCG, HCGS  HEPATIC: No results found for: ALBUMIN, PROTTOTAL, ALT, AST, GGT, ALKPHOS,  BILITOTAL, BILIDIRECT, DAVIE  OTHER:   Lab Results   Component Value Date    BRAIN 9.8 08/29/2018       Anesthesia Plan    ASA Status:  2   NPO Status:  NPO Appropriate    Anesthesia Type: MAC.     - Reason for MAC: straight local not clinically adequate   Induction: Intravenous, Propofol.   Maintenance: TIVA.        Consents    Anesthesia Plan(s) and associated risks, benefits, and realistic alternatives discussed. Questions answered and patient/representative(s) expressed understanding.    - Discussed:     - Discussed with:  Patient, Spouse      - Extended Intubation/Ventilatory Support Discussed: No.      - Patient is DNR/DNI Status: No    Use of blood products discussed: No .     Postoperative Care    Pain management: IV analgesics, Oral pain medications.   PONV prophylaxis: Ondansetron (or other 5HT-3), Background Propofol Infusion     Comments:                Avery Cox MD

## 2023-04-04 NOTE — ANESTHESIA POSTPROCEDURE EVALUATION
Patient: Avery Hernadez    Procedure: Procedure(s):  left first metatarsalphalangeal joint fusion       Anesthesia Type:  MAC    Note:  Disposition: Outpatient   Postop Pain Control: Uneventful            Sign Out: Well controlled pain   PONV: No   Neuro/Psych: Uneventful            Sign Out: Acceptable/Baseline neuro status   Airway/Respiratory: Uneventful            Sign Out: Acceptable/Baseline resp. status   CV/Hemodynamics: Uneventful            Sign Out: Acceptable CV status; No obvious hypovolemia; No obvious fluid overload   Other NRE: NONE   DID A NON-ROUTINE EVENT OCCUR? No           Last vitals:  Vitals Value Taken Time   /86 04/04/23 1015   Temp 97.6  F (36.4  C) 04/04/23 1015   Pulse 69 04/04/23 1015   Resp 16 04/04/23 1015   SpO2 98 % 04/04/23 1017   Vitals shown include unvalidated device data.    Electronically Signed By: Avery Cox MD  April 4, 2023  11:26 AM

## 2023-04-04 NOTE — ANESTHESIA CARE TRANSFER NOTE
Patient: Avery Hernadez    Procedure: Procedure(s):  left first metatarsalphalangeal joint fusion       Diagnosis: Hallux rigidus, left foot [M20.22]  Diagnosis Additional Information: No value filed.    Anesthesia Type:   MAC     Note:    Oropharynx: oropharynx clear of all foreign objects and spontaneously breathing  Level of Consciousness: awake  Oxygen Supplementation: room air    Independent Airway: airway patency satisfactory and stable  Dentition: dentition unchanged  Vital Signs Stable: post-procedure vital signs reviewed and stable  Report to RN Given: handoff report given  Patient transferred to: Phase II    Handoff Report: Identifed the Patient, Identified the Reponsible Provider, Reviewed the pertinent medical history, Discussed the surgical course, Reviewed Intra-OP anesthesia mangement and issues during anesthesia, Set expectations for post-procedure period and Allowed opportunity for questions and acknowledgement of understanding      Vitals:  Vitals Value Taken Time   /92 04/04/23 0945   Temp 97.4  F (36.3  C) 04/04/23 0943   Pulse 73 04/04/23 0945   Resp 16 04/04/23 0943   SpO2 98 % 04/04/23 0948   Vitals shown include unvalidated device data.    Electronically Signed By: GLORIA Bower CRNA  April 4, 2023  9:48 AM

## 2023-04-04 NOTE — BRIEF OP NOTE
[facility] Brief Operative Note    Pre-operative diagnosis: Hallux rigidus, left foot [M20.22]   Post-operative diagnosis same   Procedure: Procedure(s):  left first metatarsalphalangeal joint fusion   Surgeon(s): Surgeon(s) and Role:     * Taco Brown DPM - Primary   Estimated blood loss: 1 mL    Specimens: * No specimens in log *   Findings: arthritis

## 2023-04-12 ENCOUNTER — OFFICE VISIT (OUTPATIENT)
Dept: PODIATRY | Facility: CLINIC | Age: 73
End: 2023-04-12
Payer: COMMERCIAL

## 2023-04-12 VITALS — SYSTOLIC BLOOD PRESSURE: 127 MMHG | HEART RATE: 84 BPM | DIASTOLIC BLOOD PRESSURE: 80 MMHG

## 2023-04-12 DIAGNOSIS — M20.22 HALLUX RIGIDUS, LEFT FOOT: Primary | ICD-10-CM

## 2023-04-12 PROCEDURE — 99024 POSTOP FOLLOW-UP VISIT: CPT | Performed by: PODIATRIST

## 2023-04-12 RX ORDER — FLUCONAZOLE 200 MG/1
200 TABLET ORAL
COMMUNITY
Start: 2023-03-10

## 2023-04-12 NOTE — LETTER
4/12/2023         RE: Avery Hernadez  [address]  [place] [address]        Dear Colleague,    Thank you for referring your patient, Avery Hernadez, to the [facility]. Please see a copy of my visit note below.    Subjective:    Patient is 8 days postopp left first MTPJ fusion done on April 4, 2023.  Patient no longer taking pain pills.  He states his foot feels better now than before surgery.  He has been walking putting weight on his heel at times.  He is otherwise using the crutches.  He has no other new complaints.    Objective:    Dressings clean, dry and intact.  Incision dry, well coapted with no dehiscence or drainage.  Pulses are palpable, sensation to light touch is intact.  Normal postopp edema and ecchymosis.  No erythema on the opperative site.    Hallux in good alignment.      Assessment: Postopp left first MTPJ fusion    Plan: New dressing was placed on the foot.  Patient will keep this dry.  Discussed the importance of nonweightbearing at all times.  Dispensed plantarflexed CAM Walker just to protect this.  Patient will return to clinic in 1 week for suture removal and x-ray.    Taco Brown DPM, FACFAS          Again, thank you for allowing me to participate in the care of your patient.        Sincerely,        Taco Brown DPM

## 2023-04-12 NOTE — PATIENT INSTRUCTIONS
We wish you continued good healing. If you have any questions or concerns, please do not hesitate to contact us at  [phone]    [employer] (secure e-mail communication and access to your chart) to send a message or to make an appointment.    Please remember to call and schedule a follow up appointment if one was recommended at your earliest convenience.     PODIATRY CLINIC HOURS  TELEPHONE NUMBER    Dr. Taco WHEELERPMINA [facility]        Clinics:  [facility]   [place]  Tuesday 1PM-6PM  [place]  Wednesday 745AM-330PM  [place]  Thursday/Friday 745AM-230PM       [place] APPOINTMENTS  [phone]    [place] APPOINTMENTS  [phone]        If you need a medication refill, please contact us you may need lab work and/or a follow up visit prior to your refill (i.e. Antifungal medications).  If MRI needed please call Imaging at [phone]   HOW DO I GET MY KNEE SCOOTER? Knee scooters can be picked up at ANY Medical Supply stores with your knee scooter Prescription.  OR  Bring your signed prescription to an [facility] Medical Equipment showroom.

## 2023-04-12 NOTE — PROGRESS NOTES
Subjective:    Patient is 8 days postopp left first MTPJ fusion done on April 4, 2023.  Patient no longer taking pain pills.  He states his foot feels better now than before surgery.  He has been walking putting weight on his heel at times.  He is otherwise using the crutches.  He has no other new complaints.    Objective:    Dressings clean, dry and intact.  Incision dry, well coapted with no dehiscence or drainage.  Pulses are palpable, sensation to light touch is intact.  Normal postopp edema and ecchymosis.  No erythema on the opperative site.    Hallux in good alignment.      Assessment: Postopp left first MTPJ fusion    Plan: New dressing was placed on the foot.  Patient will keep this dry.  Discussed the importance of nonweightbearing at all times.  Dispensed plantarflexed CAM Walker just to protect this.  Patient will return to clinic in 1 week for suture removal and x-ray.    Taco Brown DPM, FACFAS

## 2023-04-19 ENCOUNTER — OFFICE VISIT (OUTPATIENT)
Dept: PODIATRY | Facility: CLINIC | Age: 73
End: 2023-04-19
Payer: COMMERCIAL

## 2023-04-19 ENCOUNTER — ANCILLARY PROCEDURE (OUTPATIENT)
Dept: GENERAL RADIOLOGY | Facility: CLINIC | Age: 73
End: 2023-04-19
Attending: PODIATRIST
Payer: COMMERCIAL

## 2023-04-19 DIAGNOSIS — M20.22 HALLUX RIGIDUS, LEFT FOOT: ICD-10-CM

## 2023-04-19 DIAGNOSIS — M20.22 HALLUX RIGIDUS, LEFT FOOT: Primary | ICD-10-CM

## 2023-04-19 PROCEDURE — 73630 X-RAY EXAM OF FOOT: CPT | Mod: LT | Performed by: RADIOLOGY

## 2023-04-19 PROCEDURE — 99024 POSTOP FOLLOW-UP VISIT: CPT | Performed by: PODIATRIST

## 2023-04-19 NOTE — PROGRESS NOTES
Subjective:    Patient is 15 days postopp left first MTPJ fusion done on April 4, 2023.  Patient no longer taking pain pills.  He states his foot feels better now than before surgery.  He has been nonweightbearing.  He has no new complaints.    Objective:    Dressings clean, dry and intact.  Incision dry, well coapted with no dehiscence or drainage with suture removal.  Pulses are palpable, sensation to light touch is intact.  Normal postopp edema.  No erythema or ecchymosis on the opperative site.    Hallux in good alignment.     X-rays reveal the fusion site is tight and the hardware is in place    Assessment: Postopp left first MTPJ fusion    Plan: Sutures removed today.  X-ray taken of left foot.  Continue nonweightbearing for the next 3 weeks.  Gave 2-week warning.  In 3 weeks we will start walking in cam walker and we adjusted this to 90 degrees.  If any pain or swelling will go back to nonweightbearing.  We will start gradually.  Return to clinic in 4 weeks.    Taco Brown DPM, FACFAS

## 2023-04-19 NOTE — LETTER
4/19/2023         RE: Avery Hernadez  [address]  [place] [address]        Dear Colleague,    Thank you for referring your patient, Avery Hernadez, to the [facility]. Please see a copy of my visit note below.    Subjective:    Patient is 15 days postopp left first MTPJ fusion done on April 4, 2023.  Patient no longer taking pain pills.  He states his foot feels better now than before surgery.  He has been nonweightbearing.  He has no new complaints.    Objective:    Dressings clean, dry and intact.  Incision dry, well coapted with no dehiscence or drainage with suture removal.  Pulses are palpable, sensation to light touch is intact.  Normal postopp edema.  No erythema or ecchymosis on the opperative site.    Hallux in good alignment.     X-rays reveal the fusion site is tight and the hardware is in place    Assessment: Postopp left first MTPJ fusion    Plan: Sutures removed today.  X-ray taken of left foot.  Continue nonweightbearing for the next 3 weeks.  Gave 2-week warning.  In 3 weeks we will start walking in cam walker and we adjusted this to 90 degrees.  If any pain or swelling will go back to nonweightbearing.  We will start gradually.  Return to clinic in 4 weeks.    Taco Brown DPM, FACFAS          Again, thank you for allowing me to participate in the care of your patient.        Sincerely,        Taco Brown DPM

## 2023-05-17 ENCOUNTER — OFFICE VISIT (OUTPATIENT)
Dept: PODIATRY | Facility: CLINIC | Age: 73
End: 2023-05-17
Payer: COMMERCIAL

## 2023-05-17 ENCOUNTER — ANCILLARY PROCEDURE (OUTPATIENT)
Dept: GENERAL RADIOLOGY | Facility: CLINIC | Age: 73
End: 2023-05-17
Attending: PODIATRIST
Payer: COMMERCIAL

## 2023-05-17 VITALS — DIASTOLIC BLOOD PRESSURE: 80 MMHG | SYSTOLIC BLOOD PRESSURE: 150 MMHG | HEART RATE: 76 BPM

## 2023-05-17 DIAGNOSIS — M20.22 HALLUX RIGIDUS, LEFT FOOT: Primary | ICD-10-CM

## 2023-05-17 DIAGNOSIS — M20.22 HALLUX RIGIDUS, LEFT FOOT: ICD-10-CM

## 2023-05-17 PROCEDURE — 99024 POSTOP FOLLOW-UP VISIT: CPT | Performed by: PODIATRIST

## 2023-05-17 PROCEDURE — 73630 X-RAY EXAM OF FOOT: CPT | Mod: LT | Performed by: RADIOLOGY

## 2023-05-17 NOTE — LETTER
5/17/2023         RE: Avery Hernadez  [address]  [place] [address]        Dear Colleague,    Thank you for referring your patient, Avery Hernadez, to the [facility]. Please see a copy of my visit note below.    Subjective:    Patient is 6 weeks 1 day postopp left first MTPJ fusion done on April 4, 2023.  Patient has no pain.  He started walking in his shoe last Saturday.  Thinks he may have been walking on the side of his foot.  Admits he has been perhaps walking too much and he did get some increased swelling around here.  Has no other new complaints.    Objective:    Incision dry, well healed.  Pulses are palpable, sensation to light touch is intact.  Normal postopp edema.  No erythema or ecchymosis on the opperative site.    Hallux in good alignment.  The fusion site is solid.  There is no pain with palpation or loading this.    X-rays reveal the fusion site is tight and the hardware is in place    Assessment: Postopp left first MTPJ fusion    Plan: X-ray taken of left foot.  Discussed with patient fusion site is tight and hardware is in place.  Discussed that he should not be doing a lot of walking now.  Only gentle walking and his activities of daily living.  He will not golf or walk for exercise.  Explained importance of wearing stiff shoe.  He will only take short steps and be deliberate about his walking and try not to compensate.  Discussed importance of not being in a hurry.  Explained total healing of this will take approximately a year.  Return to clinic in 3 weeks for repeat x-ray.    Taco Brown DPM, FACFAS          Again, thank you for allowing me to participate in the care of your patient.        Sincerely,        Taco Brown DPM

## 2023-05-17 NOTE — PROGRESS NOTES
Subjective:    Patient is 6 weeks 1 day postopp left first MTPJ fusion done on April 4, 2023.  Patient has no pain.  He started walking in his shoe last Saturday.  Thinks he may have been walking on the side of his foot.  Admits he has been perhaps walking too much and he did get some increased swelling around here.  Has no other new complaints.    Objective:    Incision dry, well healed.  Pulses are palpable, sensation to light touch is intact.  Normal postopp edema.  No erythema or ecchymosis on the opperative site.    Hallux in good alignment.  The fusion site is solid.  There is no pain with palpation or loading this.    X-rays reveal the fusion site is tight and the hardware is in place    Assessment: Postopp left first MTPJ fusion    Plan: X-ray taken of left foot.  Discussed with patient fusion site is tight and hardware is in place.  Discussed that he should not be doing a lot of walking now.  Only gentle walking and his activities of daily living.  He will not golf or walk for exercise.  Explained importance of wearing stiff shoe.  He will only take short steps and be deliberate about his walking and try not to compensate.  Discussed importance of not being in a hurry.  Explained total healing of this will take approximately a year.  Return to clinic in 3 weeks for repeat x-ray.    Taco Brown DPM, FACFAS

## 2023-05-17 NOTE — PATIENT INSTRUCTIONS
We wish you continued good healing. If you have any questions or concerns, please do not hesitate to contact us at  [phone]    [employer] (secure e-mail communication and access to your chart) to send a message or to make an appointment.    Please remember to call and schedule a follow up appointment if one was recommended at your earliest convenience.     PODIATRY CLINIC HOURS  TELEPHONE NUMBER    Dr. Taco WHEELERPMINA [facility]        Clinics:  [facility]  [facility], [place], [facility]  Tuesday 1PM-6PM  [facility]  Wednesday 745AM-330PM  [place]  Thursday/Friday 745AM-230PM  [place]   1st and 3rd Mondays  845-430 PM   [place] APPOINTMENTS  [phone]    [place] APPOINTMENTS  [phone]    [place] APPOINTMENTS  [phone]        If you need a medication refill, please contact us you may need lab work and/or a follow up visit prior to your refill (i.e. Antifungal medications).  If MRI needed please call Imaging at [phone]   HOW DO I GET MY KNEE SCOOTER? Knee scooters can be picked up at ANY Medical Supply stores with your knee scooter Prescription.  OR  Bring your signed prescription to an [facility] Medical Equipment showroom.  Call or bring in your Orthotics order to any Orthotics locations. Or call [phone]

## 2023-06-07 ENCOUNTER — ANCILLARY PROCEDURE (OUTPATIENT)
Dept: GENERAL RADIOLOGY | Facility: CLINIC | Age: 73
End: 2023-06-07
Attending: PODIATRIST
Payer: COMMERCIAL

## 2023-06-07 ENCOUNTER — OFFICE VISIT (OUTPATIENT)
Dept: PODIATRY | Facility: CLINIC | Age: 73
End: 2023-06-07
Payer: COMMERCIAL

## 2023-06-07 VITALS — OXYGEN SATURATION: 97 % | HEART RATE: 84 BPM | WEIGHT: 205 LBS | BODY MASS INDEX: 28.23 KG/M2

## 2023-06-07 DIAGNOSIS — M20.22 HALLUX RIGIDUS, LEFT FOOT: Primary | ICD-10-CM

## 2023-06-07 DIAGNOSIS — M20.22 HALLUX RIGIDUS, LEFT FOOT: ICD-10-CM

## 2023-06-07 PROCEDURE — 99024 POSTOP FOLLOW-UP VISIT: CPT | Performed by: PODIATRIST

## 2023-06-07 PROCEDURE — 73630 X-RAY EXAM OF FOOT: CPT | Mod: LT | Performed by: RADIOLOGY

## 2023-06-07 NOTE — LETTER
6/7/2023         RE: Avery Hernadez  [address]  [place] [address]        Dear Colleague,    Thank you for referring your patient, Avery Hernadez, to the [facility]. Please see a copy of my visit note below.    Subjective:    Patient is 9 weeks 1 day postopp left first MTPJ fusion done on April 4, 2023.  Patient has no pain.  He started walking in his shoe shoe now.  He has no pain.  Swelling decreased.  States he is walking more normal now and is very happy with his outcome.    Objective:    Incision dry, well healed.  Pulses are palpable, sensation to light touch is intact.  Normal postopp edema.  No erythema or ecchymosis on the opperative site.    Hallux in good alignment.  The fusion site is solid.  There is no pain with palpation or loading this.    X-rays reveal the fusion site is tight and the hardware is in place.  We note decreased conspicuity at fusion site    Assessment: Postopp left first MTPJ fusion    Plan: X-ray taken of left foot.  Discussed with patient fusion site is tight and hardware is in place.  Discussed harder to see joint anymore.  We will continue to walk more more and increase activities.  We will just watch for pain or increased swelling.  We will make sure he is wearing quality shoes.  RTC as needed.    Taco Brown DPM, FACFAS          Again, thank you for allowing me to participate in the care of your patient.        Sincerely,        Taco Brown DPM

## 2023-06-07 NOTE — PATIENT INSTRUCTIONS
We wish you continued good healing. If you have any questions or concerns, please do not hesitate to contact us at  [phone]    [employer] (secure e-mail communication and access to your chart) to send a message or to make an appointment.    Please remember to call and schedule a follow up appointment if one was recommended at your earliest convenience.     PODIATRY CLINIC HOURS  TELEPHONE NUMBER    Dr. Taco WHEELERPMINA [facility]        Clinics:  [facility]  [facility], [place], [facility]  Tuesday 1PM-6PM  [facility]  Wednesday 745AM-330PM  [place]  Thursday/Friday 745AM-230PM  [place]   1st and 3rd Mondays  845-430 PM   [place] APPOINTMENTS  [phone]    [place] APPOINTMENTS  [phone]    [place] APPOINTMENTS  [phone]        If you need a medication refill, please contact us you may need lab work and/or a follow up visit prior to your refill (i.e. Antifungal medications).  If MRI needed please call Imaging at [phone]   HOW DO I GET MY KNEE SCOOTER? Knee scooters can be picked up at ANY Medical Supply stores with your knee scooter Prescription.  OR  Bring your signed prescription to an [facility] Medical Equipment showroom.  Call or bring in your Orthotics order to any Orthotics locations. Or call [phone]

## 2023-06-07 NOTE — PROGRESS NOTES
Subjective:    Patient is 9 weeks 1 day postopp left first MTPJ fusion done on April 4, 2023.  Patient has no pain.  He started walking in his shoe shoe now.  He has no pain.  Swelling decreased.  States he is walking more normal now and is very happy with his outcome.    Objective:    Incision dry, well healed.  Pulses are palpable, sensation to light touch is intact.  Normal postopp edema.  No erythema or ecchymosis on the opperative site.    Hallux in good alignment.  The fusion site is solid.  There is no pain with palpation or loading this.    X-rays reveal the fusion site is tight and the hardware is in place.  We note decreased conspicuity at fusion site    Assessment: Postopp left first MTPJ fusion    Plan: X-ray taken of left foot.  Discussed with patient fusion site is tight and hardware is in place.  Discussed harder to see joint anymore.  We will continue to walk more more and increase activities.  We will just watch for pain or increased swelling.  We will make sure he is wearing quality shoes.  RTC as needed.    Taco Brown DPM, FACFAS

## (undated) DEVICE — DRAPE C-ARM MINI 5423

## (undated) DEVICE — ESU PENCIL SMOKE EVAC W/ROCKER SWITCH 0703-047-000

## (undated) DEVICE — DRAPE STERI TOWEL SM 1000

## (undated) DEVICE — SU ETHILON 4-0 FS-2 18" 662H

## (undated) DEVICE — DRSG KERLIX 4 1/2"X4YDS ROLL 6715

## (undated) DEVICE — BUR STRK ROUND 3.2X54MM FAST CUT 8 FLUTE 1608-006-149

## (undated) DEVICE — GOWN IMPERVIOUS BREATHABLE 2XL/XLONG

## (undated) DEVICE — BNDG ELASTIC 3"X5YDS UNSTERILE 6611-30

## (undated) DEVICE — NDL 25GA 1.5" 305127

## (undated) DEVICE — NDL 19GA 1.5"

## (undated) DEVICE — SOL NACL 0.9% IRRIG 1000ML BOTTLE 07138-09

## (undated) DEVICE — BNDG ELASTIC 4"X5YDS UNSTERILE 6611-40

## (undated) DEVICE — DRSG GAUZE 4X4" TRAY 6939

## (undated) DEVICE — SU VICRYL 4-0 PS-2 18" UND J496H

## (undated) DEVICE — BNDG KLING 3" 2232

## (undated) DEVICE — SU VICRYL 3-0 PS-2 18" UND J497H

## (undated) DEVICE — SYR 10ML LL W/O NDL

## (undated) DEVICE — PIN GUARD 10-1-001 101001PBX

## (undated) DEVICE — ESU GROUND PAD ADULT W/CORD E7507

## (undated) DEVICE — BLADE SAW OSCILLATING STRYK MED 9.0X25X0.38MM 2296-003-111

## (undated) DEVICE — SOL WATER IRRIG 1000ML BOTTLE 07139-09

## (undated) DEVICE — PACK EXTREMITY SOP15EXFSD

## (undated) DEVICE — PREP CHLORAPREP 26ML TINTED ORANGE  260815

## (undated) DEVICE — Device

## (undated) RX ORDER — TETRACAINE HYDROCHLORIDE 5 MG/ML
SOLUTION OPHTHALMIC
Status: DISPENSED
Start: 2023-04-04

## (undated) RX ORDER — EPINEPHRINE 1 MG/ML
INJECTION, SOLUTION, CONCENTRATE INTRAVENOUS
Status: DISPENSED
Start: 2023-04-04

## (undated) RX ORDER — BACITRACIN ZINC 500 [USP'U]/G
OINTMENT TOPICAL
Status: DISPENSED
Start: 2023-04-04

## (undated) RX ORDER — BALANCED SALT SOLUTION 6.4; .75; .48; .3; 3.9; 1.7 MG/ML; MG/ML; MG/ML; MG/ML; MG/ML; MG/ML
SOLUTION OPHTHALMIC
Status: DISPENSED
Start: 2023-04-04

## (undated) RX ORDER — BUPIVACAINE HYDROCHLORIDE 5 MG/ML
INJECTION, SOLUTION EPIDURAL; INTRACAUDAL
Status: DISPENSED
Start: 2023-04-04

## (undated) RX ORDER — MOXIFLOXACIN IN NACL,ISO-OS/PF 0.3MG/0.3
SYRINGE (ML) INTRAOCULAR
Status: DISPENSED
Start: 2023-04-04

## (undated) RX ORDER — LIDOCAINE HYDROCHLORIDE 20 MG/ML
INJECTION, SOLUTION INFILTRATION; PERINEURAL
Status: DISPENSED
Start: 2023-04-04

## (undated) RX ORDER — ONDANSETRON 2 MG/ML
INJECTION INTRAMUSCULAR; INTRAVENOUS
Status: DISPENSED
Start: 2023-04-04

## (undated) RX ORDER — ACETAMINOPHEN 325 MG/1
TABLET ORAL
Status: DISPENSED
Start: 2023-04-04

## (undated) RX ORDER — FENTANYL CITRATE 50 UG/ML
INJECTION, SOLUTION INTRAMUSCULAR; INTRAVENOUS
Status: DISPENSED
Start: 2023-04-04

## (undated) RX ORDER — CEFAZOLIN SODIUM 1 G/3ML
INJECTION, POWDER, FOR SOLUTION INTRAMUSCULAR; INTRAVENOUS
Status: DISPENSED
Start: 2023-04-04

## (undated) RX ORDER — PROPOFOL 10 MG/ML
INJECTION, EMULSION INTRAVENOUS
Status: DISPENSED
Start: 2023-04-04